# Patient Record
Sex: FEMALE | Race: ASIAN | Employment: FULL TIME | URBAN - METROPOLITAN AREA
[De-identification: names, ages, dates, MRNs, and addresses within clinical notes are randomized per-mention and may not be internally consistent; named-entity substitution may affect disease eponyms.]

---

## 2020-11-25 ENCOUNTER — OFFICE VISIT (OUTPATIENT)
Dept: URGENT CARE | Age: 25
End: 2020-11-25
Payer: COMMERCIAL

## 2020-11-25 VITALS — OXYGEN SATURATION: 99 % | TEMPERATURE: 98.7 F | RESPIRATION RATE: 15 BRPM | HEART RATE: 73 BPM

## 2020-11-25 DIAGNOSIS — J02.9 SORE THROAT: ICD-10-CM

## 2020-11-25 DIAGNOSIS — Z20.822 ENCOUNTER FOR LABORATORY TESTING FOR COVID-19 VIRUS: Primary | ICD-10-CM

## 2020-11-25 DIAGNOSIS — R51.9 NONINTRACTABLE HEADACHE, UNSPECIFIED CHRONICITY PATTERN, UNSPECIFIED HEADACHE TYPE: ICD-10-CM

## 2020-11-25 PROCEDURE — S9083 URGENT CARE CENTER GLOBAL: HCPCS | Performed by: EMERGENCY MEDICINE

## 2020-11-25 RX ORDER — PAROXETINE HYDROCHLORIDE 20 MG/1
20 TABLET, FILM COATED ORAL DAILY
COMMUNITY
End: 2022-08-08 | Stop reason: ALTCHOICE

## 2020-11-25 NOTE — PROGRESS NOTES
Pt woke this AM and noted a mild sore throat that is feeling mostly better but still \"not normal\", some mild HA, also. 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 Offered strep test as well but she declined. Denies any other symptoms. This patient was seen in [facility] at [address] [place] [address] Urgent Care while outdoors at their vehicle due to COVID-19 pandemic with PPE and focused examination in order to decrease community viral transmission       The history is provided by the patient. Sore Throat    The history is provided by the patient. This is a new problem. The current episode started 6 to 12 hours ago (woke with sx this AM). The problem has been gradually improving. There has been no fever. Associated symptoms include headaches (mild postrior aching. Does not feel lke migraine and is mild). Pertinent negatives include no diarrhea, no vomiting, no congestion, no shortness of breath, no trouble swallowing and no cough. History reviewed. No pertinent past medical history. History reviewed. No pertinent surgical history. History reviewed. No pertinent family history.      Social History     Socioeconomic History    Marital status: UNKNOWN     Spouse name: Not on file    Number of children: Not on file    Years of education: Not on file    Highest education level: Not on file   Occupational History    Not on file   Social Needs    Financial resource strain: Not on file    Food insecurity     Worry: Not on file     Inability: Not on file    Transportation needs     Medical: Not on file     Non-medical: Not on file   Tobacco Use    Smoking status: Never Smoker    Smokeless tobacco: Never Used   Substance and Sexual Activity    Alcohol use: Not Currently    Drug use: Not on file    Sexual activity: Not on file   Lifestyle    Physical activity     Days per week: Not on file     Minutes per session: Not on file    Stress: Not on file   Relationships    Social connections     Talks on phone: Not on file     Gets together: Not on file     Attends Gnosticism service: Not on file     Active member of club or organization: Not on file     Attends meetings of clubs or organizations: Not on file     Relationship status: Not on file    Intimate partner violence     Fear of current or ex partner: Not on file     Emotionally abused: Not on file     Physically abused: Not on file     Forced sexual activity: Not on file   Other Topics Concern    Not on file   Social History Narrative    Not on file                ALLERGIES: Patient has no known allergies. Review of Systems   Constitutional: Negative for chills and fever. HENT: Positive for sore throat (mild irritated feeling, no \"hot potatoe voice\" Managing secretions well. Eating and drinking normally). Negative for congestion, rhinorrhea, sinus pressure, sneezing and trouble swallowing. Eyes: Negative for photophobia and pain. Respiratory: Negative for cough and shortness of breath. Cardiovascular: Negative for chest pain. Gastrointestinal: Negative for abdominal pain, diarrhea, nausea and vomiting. Musculoskeletal: Negative for myalgias, neck pain and neck stiffness. Skin: Negative for rash. Neurological: Positive for headaches (mild postrior aching. Does not feel lke migraine and is mild). Negative for dizziness and numbness. Vitals:    11/25/20 1824   Pulse: 73   Resp: 15   Temp: 98.7 °F (37.1 °C)   SpO2: 99%       Physical Exam  Vitals signs and nursing note reviewed. Constitutional:       General: She is not in acute distress. Appearance: Normal appearance. She is normal weight. She is not ill-appearing or toxic-appearing. Comments: Pt examined in a vehicle  Well appearing   HENT:      Head:      Jaw: No trismus, tenderness or swelling. Nose: No rhinorrhea. Mouth/Throat:      Mouth: Mucous membranes are moist.      Pharynx: Oropharynx is clear. Uvula midline.  No pharyngeal swelling, oropharyngeal exudate, posterior oropharyngeal erythema or uvula swelling. Tonsils: No tonsillar exudate or tonsillar abscesses. Comments: Voice clear and managing secretions well. No redness, swelling or exudate  Eyes:      Extraocular Movements: Extraocular movements intact. Conjunctiva/sclera: Conjunctivae normal.      Pupils: Pupils are equal, round, and reactive to light. Comments: No photophobia   Neck:      Musculoskeletal: Normal range of motion and neck supple. No neck rigidity or muscular tenderness. Cardiovascular:      Rate and Rhythm: Normal rate and regular rhythm. Heart sounds: Normal heart sounds. Pulmonary:      Effort: Pulmonary effort is normal. No respiratory distress. Breath sounds: Normal breath sounds. No stridor. No wheezing, rhonchi or rales. Abdominal:      General: Bowel sounds are normal.   Lymphadenopathy:      Cervical: No cervical adenopathy. Skin:     Findings: No rash. Comments: Pt examined in vehicle, limited exposure but what is seen is negative for rashes      Neurological:      General: No focal deficit present. Mental Status: She is alert and oriented to person, place, and time. Comments: Coordinated movement noted on exam with clear speech.  No appreciable deficits appreciated during exam while pt is in vehicle     Psychiatric:         Mood and Affect: Mood normal.         Behavior: Behavior normal.         MDM    Procedures

## 2020-11-29 LAB — SARS-COV-2, NAA: NOT DETECTED

## 2022-08-08 ENCOUNTER — OFFICE VISIT (OUTPATIENT)
Dept: FAMILY MEDICINE CLINIC | Age: 27
End: 2022-08-08
Payer: MEDICAID

## 2022-08-08 VITALS
HEART RATE: 72 BPM | WEIGHT: 119 LBS | TEMPERATURE: 98 F | RESPIRATION RATE: 18 BRPM | DIASTOLIC BLOOD PRESSURE: 71 MMHG | SYSTOLIC BLOOD PRESSURE: 108 MMHG | OXYGEN SATURATION: 100 % | HEIGHT: 60 IN | BODY MASS INDEX: 23.36 KG/M2

## 2022-08-08 DIAGNOSIS — Z01.84: ICD-10-CM

## 2022-08-08 DIAGNOSIS — Z11.59 NEED FOR HEPATITIS C SCREENING TEST: ICD-10-CM

## 2022-08-08 DIAGNOSIS — F32.A ANXIETY AND DEPRESSION: ICD-10-CM

## 2022-08-08 DIAGNOSIS — Z76.89 ENCOUNTER TO ESTABLISH CARE: Primary | ICD-10-CM

## 2022-08-08 DIAGNOSIS — F41.9 ANXIETY AND DEPRESSION: ICD-10-CM

## 2022-08-08 DIAGNOSIS — Z01.84 IMMUNITY TO VARICELLA DETERMINED BY SEROLOGIC TEST: ICD-10-CM

## 2022-08-08 DIAGNOSIS — Z02.0 SCHOOL PHYSICAL EXAM: ICD-10-CM

## 2022-08-08 DIAGNOSIS — H61.23 BILATERAL IMPACTED CERUMEN: ICD-10-CM

## 2022-08-08 LAB
BILIRUB UR QL STRIP: NEGATIVE
GLUCOSE UR-MCNC: NEGATIVE MG/DL
KETONES P FAST UR STRIP-MCNC: NEGATIVE MG/DL
PH UR STRIP: 6 [PH] (ref 4.6–8)
PROT UR QL STRIP: NEGATIVE
SP GR UR STRIP: 1 (ref 1–1.03)
UA UROBILINOGEN AMB POC: NORMAL (ref 0.2–1)
URINALYSIS CLARITY POC: CLEAR
URINALYSIS COLOR POC: YELLOW
URINE BLOOD POC: NORMAL
URINE LEUKOCYTES POC: NEGATIVE
URINE NITRITES POC: NEGATIVE

## 2022-08-08 PROCEDURE — 69210 REMOVE IMPACTED EAR WAX UNI: CPT

## 2022-08-08 PROCEDURE — 81003 URINALYSIS AUTO W/O SCOPE: CPT

## 2022-08-08 PROCEDURE — 99203 OFFICE O/P NEW LOW 30 MIN: CPT

## 2022-08-08 RX ORDER — DICYCLOMINE HYDROCHLORIDE 10 MG/1
CAPSULE ORAL AS NEEDED
COMMUNITY
Start: 2022-07-19

## 2022-08-08 RX ORDER — OMEPRAZOLE 20 MG/1
CAPSULE, DELAYED RELEASE ORAL AS NEEDED
COMMUNITY
Start: 2022-08-03

## 2022-08-08 NOTE — PROGRESS NOTES
Subjective: (As above and below)     Chief Complaint   Patient presents with    [facility]    School/[place] Physical       Jose German is a 32 y.o. female with a history of anxiety/depression, IBS who presents to the office to [facility] and school physical.     Previously used [facility] as PCP. 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 Pap smear: 4 years ago. Dental Exam: within the year. Eye exam: More than 3 years. Reading glasses   Tdap: less than 10 years ago   COVID vaccine: UTD, 3 doses     Diet: Not a lot of fast foods, trying to eat vegetables, Lots of chicken, tries to stay away from red meat. Drinks mostly water. Exercise: 3-4 times a week. Weight lifting, soccer     Anxiety/depression:   Was previously on Fluoxetine and did not have an effect. Then she was switched to paxil 20 mg but it also did not work and she had side effects: fatigue, worsening depression and dry mouth. Currently not on any medication. Has improved daily lifestyle: exercising more and listening to music to help manage symptoms. Patient endorses that symptoms are well-controlled and is not interested in taking medication at this time. Denies SI/HI      Reviewed and agree with Nurse Note duplicated in this note. Reviewed PmHx, RxHx, FmHx, SocHx, AllgHx and updated in chart. Current Outpatient Medications   Medication Sig    dicyclomine (BENTYL) 10 mg capsule     omeprazole (PRILOSEC) 20 mg capsule      No current facility-administered medications for this visit.       No Known Allergies   Past Medical History:   Diagnosis Date    Anxiety     Depression       Past Surgical History:   Procedure Laterality Date    HX WISDOM TEETH EXTRACTION Bilateral       Family History   Problem Relation Age of Onset    Diabetes Paternal Uncle     Diabetes Paternal Grandmother       Social History     Socioeconomic History    Marital status: SINGLE     Spouse name: Not on file    Number of children: Not on file    Years of education: Not on file    Highest education level: Not on file   Occupational History    Not on file   Tobacco Use    Smoking status: Never    Smokeless tobacco: Never   Vaping Use    Vaping Use: Never used   Substance and Sexual Activity    Alcohol use: Yes    Drug use: Never    Sexual activity: Yes     Partners: Female     Birth control/protection: I.U.D. Other Topics Concern    Not on file   Social History Narrative    Not on file     Social Determinants of Health     Financial Resource Strain: Not on file   Food Insecurity: Not on file   Transportation Needs: Not on file   Physical Activity: Not on file   Stress: Not on file   Social Connections: Not on file   Intimate Partner Violence: Not on file   Housing Stability: Not on file             Review of Systems   Constitutional:  Negative for chills, fever, malaise/fatigue and weight loss. HENT: Negative. Eyes: Negative. Respiratory:  Negative for cough and shortness of breath. Cardiovascular:  Negative for chest pain, palpitations, orthopnea and leg swelling. Gastrointestinal:  Negative for abdominal pain, blood in stool, constipation, diarrhea, heartburn, nausea and vomiting. Genitourinary:  Negative for dysuria, frequency, hematuria and urgency. Musculoskeletal: Negative. Skin: Negative. Neurological:  Negative for dizziness, tingling, weakness and headaches. Endo/Heme/Allergies: Negative. Psychiatric/Behavioral: Negative. Objective:     Physical Examination:    Visit Vitals  /71 (BP 1 Location: Left upper arm, BP Patient Position: Sitting, BP Cuff Size: Adult)   Pulse 72   Temp 98 °F (36.7 °C) (Temporal)   Resp 18   Ht 5' (1.524 m)   Wt 119 lb (54 kg)   SpO2 100%   BMI 23.24 kg/m²       Physical Exam  Vitals and nursing note reviewed. Constitutional:       General: She is not in acute distress. Appearance: Normal appearance. HENT:      Head: Normocephalic and atraumatic.       Right Ear: Ear canal and external ear normal. There is no impacted cerumen. Left Ear: Ear canal and external ear normal. There is no impacted cerumen. Ears:      Comments: Post cerumen removal: Bilateral TM normal      Nose: Nose normal.      Mouth/Throat:      Mouth: Mucous membranes are moist.      Pharynx: Oropharynx is clear. Eyes:      Extraocular Movements: Extraocular movements intact. Conjunctiva/sclera: Conjunctivae normal.      Pupils: Pupils are equal, round, and reactive to light. Neck:      Vascular: No carotid bruit. Cardiovascular:      Rate and Rhythm: Normal rate and regular rhythm. Pulses: Normal pulses. Heart sounds: Normal heart sounds. No murmur heard. No gallop. Pulmonary:      Effort: Pulmonary effort is normal. No respiratory distress. Breath sounds: Normal breath sounds. Abdominal:      General: Abdomen is flat. Palpations: Abdomen is soft. Tenderness: no abdominal tenderness   Musculoskeletal:      Cervical back: No tenderness. Right lower leg: No edema. Left lower leg: No edema. Lymphadenopathy:      Cervical: No cervical adenopathy. Skin:     General: Skin is warm and dry. Neurological:      Mental Status: She is alert and oriented to person, place, and time. Deep Tendon Reflexes: Reflexes are normal and symmetric. Psychiatric:         Mood and Affect: Mood normal.         Behavior: Behavior normal.         Thought Content:  Thought content normal.         Judgment: Judgment normal.           3 most recent PHQ Screens 8/8/2022   Little interest or pleasure in doing things Not at all   Feeling down, depressed, irritable, or hopeless Not at all   Total Score PHQ 2 0      Results for orders placed or performed in visit on 08/08/22   AMB POC URINALYSIS DIP STICK AUTO W/O MICRO     Status: None   Result Value Ref Range Status    Color (UA POC) Yellow  Final    Clarity (UA POC) Clear  Final    Glucose (UA POC) Negative Negative Final Bilirubin (UA POC) Negative Negative Final    Ketones (UA POC) Negative Negative Final    Specific gravity (UA POC) 1.005 1.001 - 1.035 Final    Blood (UA POC) Trace Negative Final    pH (UA POC) 6.0 4.6 - 8.0 Final    Protein (UA POC) Negative Negative Final    Urobilinogen (UA POC) 0.2 mg/dL 0.2 - 1 Final    Nitrites (UA POC) Negative Negative Final    Leukocyte esterase (UA POC) Negative Negative Final        Assessment/ Plan:   Differential diagnosis and treatment options reviewed with patient who is in agreement with treatment plan as outlined below. 1. Encounter to establish care  - Recommend patient get a well woman exam. Pap smear is due. Informed her that she can make an appointment with me. - CBC WITH AUTOMATED DIFF; Future  - METABOLIC PANEL, COMPREHENSIVE; Future    2. School physical exam  - Waiting for titers labs to return. Once labs are back will complete form. - Eye exam was abnormal today because she did not have her reading glasses with her. Recommend patient get an eye exam as it has been about 3 years since last one.   - AMB POC URINALYSIS DIP STICK AUTO W/O MICRO    3. Anxiety and depression  - Continue with healthy lifestyle behaviors   - Return to clinic if symptoms become uncontrolled. Will consider an SNRI if needed. - She is aware to go to ER if she has SI/HI    4. Bilateral impacted cerumen  - REMOVE IMPACTED EAR WAX    5. Immunity to varicella determined by serologic test  - VZV AB, IGG; Future      6. Immunity to polio determined by serologic test  - Based on records from patient patient is not UTD on polio vaccine  - Poliovirus antibody add on lab order sent. - Patient will contact [facility] to get immunization records. 7. Need for hepatitis C screening test  - HEPATITIS C AB; Future         Follow-up and Dispositions    Return in about 1 year (around 8/8/2023), or if symptoms worsen or fail to improve.           Patient Labs were reviewed: yes  Patient Past Records were reviewed:  yes    Verbal and written instructions (see AVS) provided. Patient expresses understanding and agreement of diagnosis and treatment plan.     Eleanor Victoria NP

## 2022-08-08 NOTE — PROGRESS NOTES
Chief Complaint   Patient presents with    [address] Maintenance reviewed     1. Have you been to the ER, urgent care clinic since your last visit? Hospitalized since your last visit? No     2. Have you seen or consulted any other health care providers outside of the [address] [place] [address] since your last visit? Include any pap smears or colon screening.   No

## 2022-08-09 LAB
ALBUMIN SERPL-MCNC: 4.3 G/DL (ref 3.5–5)
ALBUMIN/GLOB SERPL: 1.3 {RATIO} (ref 1.1–2.2)
ALP SERPL-CCNC: 56 U/L (ref 45–117)
ALT SERPL-CCNC: 23 U/L (ref 12–78)
ANION GAP SERPL CALC-SCNC: 6 MMOL/L (ref 5–15)
AST SERPL-CCNC: 17 U/L (ref 15–37)
BASOPHILS # BLD: 0 K/UL (ref 0–0.1)
BASOPHILS NFR BLD: 1 % (ref 0–1)
BILIRUB SERPL-MCNC: 0.4 MG/DL (ref 0.2–1)
BUN SERPL-MCNC: 13 MG/DL (ref 6–20)
BUN/CREAT SERPL: 14 (ref 12–20)
CALCIUM SERPL-MCNC: 9.7 MG/DL (ref 8.5–10.1)
CHLORIDE SERPL-SCNC: 107 MMOL/L (ref 97–108)
CO2 SERPL-SCNC: 27 MMOL/L (ref 21–32)
COMMENT, HOLDF: NORMAL
CREAT SERPL-MCNC: 0.93 MG/DL (ref 0.55–1.02)
DIFFERENTIAL METHOD BLD: NORMAL
EOSINOPHIL # BLD: 0.1 K/UL (ref 0–0.4)
EOSINOPHIL NFR BLD: 1 % (ref 0–7)
ERYTHROCYTE [DISTWIDTH] IN BLOOD BY AUTOMATED COUNT: 13.2 % (ref 11.5–14.5)
GLOBULIN SER CALC-MCNC: 3.4 G/DL (ref 2–4)
GLUCOSE SERPL-MCNC: 79 MG/DL (ref 65–100)
HCT VFR BLD AUTO: 43.4 % (ref 35–47)
HCV AB SERPL QL IA: NONREACTIVE
HGB BLD-MCNC: 13.5 G/DL (ref 11.5–16)
IMM GRANULOCYTES # BLD AUTO: 0 K/UL (ref 0–0.04)
IMM GRANULOCYTES NFR BLD AUTO: 0 % (ref 0–0.5)
LYMPHOCYTES # BLD: 2 K/UL (ref 0.8–3.5)
LYMPHOCYTES NFR BLD: 31 % (ref 12–49)
MCH RBC QN AUTO: 28.2 PG (ref 26–34)
MCHC RBC AUTO-ENTMCNC: 31.1 G/DL (ref 30–36.5)
MCV RBC AUTO: 90.6 FL (ref 80–99)
MONOCYTES # BLD: 0.6 K/UL (ref 0–1)
MONOCYTES NFR BLD: 9 % (ref 5–13)
NEUTS SEG # BLD: 3.8 K/UL (ref 1.8–8)
NEUTS SEG NFR BLD: 58 % (ref 32–75)
NRBC # BLD: 0 K/UL (ref 0–0.01)
NRBC BLD-RTO: 0 PER 100 WBC
PLATELET # BLD AUTO: 284 K/UL (ref 150–400)
PMV BLD AUTO: 10.5 FL (ref 8.9–12.9)
POTASSIUM SERPL-SCNC: 4.2 MMOL/L (ref 3.5–5.1)
PROT SERPL-MCNC: 7.7 G/DL (ref 6.4–8.2)
RBC # BLD AUTO: 4.79 M/UL (ref 3.8–5.2)
SAMPLES BEING HELD,HOLD: NORMAL
SODIUM SERPL-SCNC: 140 MMOL/L (ref 136–145)
WBC # BLD AUTO: 6.6 K/UL (ref 3.6–11)

## 2022-08-10 LAB — VZV IGG SER IA-ACNC: <135 INDEX

## 2022-08-12 NOTE — PROGRESS NOTES
Patient came to  school form. Explained to patient she will need to be revaccinated to varicella since she lacks immunity. Patient expressed understanding and will see if she can get vaccinated at the health department or through the school. All other labs were unremarkable.

## 2022-09-26 ENCOUNTER — TELEPHONE (OUTPATIENT)
Dept: FAMILY MEDICINE CLINIC | Age: 27
End: 2022-09-26

## 2022-09-26 NOTE — TELEPHONE ENCOUNTER
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

## 2022-09-26 NOTE — TELEPHONE ENCOUNTER
Pt is calling because she was informed that if she had lifestyle changes to call to let you know    Pt is having a hard time sleeping (both falling and staying asleep) and having anxiety problems    Benadryl and Trazodone dont work or either puts her out to hard making her groggy the next day     What else can she try?

## 2022-09-29 NOTE — TELEPHONE ENCOUNTER
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 She tried reaching out to her psychiatrist several times, but not recv'd a call back. This was a recommendation from the board to reach out to her PCP.

## 2022-10-06 ENCOUNTER — VIRTUAL VISIT (OUTPATIENT)
Dept: FAMILY MEDICINE CLINIC | Age: 27
End: 2022-10-06
Payer: MEDICAID

## 2022-10-06 DIAGNOSIS — F32.5 MAJOR DEPRESSIVE DISORDER WITH SINGLE EPISODE, IN FULL REMISSION (HCC): Primary | ICD-10-CM

## 2022-10-06 PROCEDURE — 99214 OFFICE O/P EST MOD 30 MIN: CPT | Performed by: FAMILY MEDICINE

## 2022-10-06 NOTE — PROGRESS NOTES
Health Maintenance Due   Topic Date Due    Pap Smear  Never done     1. \"Have you been to the ER, urgent care clinic since your last visit? Hospitalized since your last visit? \" No    2. \"Have you seen or consulted any other health care providers outside of the [address] [place] [address] since your last visit? \" No     3. For patients aged 39-70: Has the patient had a colonoscopy / FIT/ Cologuard? NA - based on age      If the patient is female:    4. For patients aged 41-77: Has the patient had a mammogram within the past 2 years? NA - based on age or sex      11. For patients aged 21-65: Has the patient had a pap smear? Yes - Care Gap present.  Rooming MA/LPN to request most recent results

## 2022-10-06 NOTE — PROGRESS NOTES
THIS VISIT WAS COMPLETED VIRTUALLY USING [employer]. FELICITA LEE  Chato Amin is a 32 y.o. female who presents to follow-up on depression. Recently established care. 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 She needs documentation on her current state of mental health. For background tells me that she had a episode of deep depression in 2019. This was a particularly stressful time in her life. Was having a hard time with relationships and life goals. Hit an emotional bottom and made a suicide attempt with beer and overdose on her Paxil. Was hospitalized for a week on psychiatric floor. Paxil was continued with no dose change. Discharged to follow-up with a psychiatrist which she did for about a year. The only change made during that time was Paxil was increased from 10 mg to 20 mg. As a result of this episode the [facility] pharmacy had a case opened monitoring her. She tells me that her psychiatrist ultimately completed the paperwork needed to close that case. 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 Tells me that she ultimately took the Paxil 20 mg for a year or 2 and weaned herself off. Toward the end of her course with Paxil she actually felt like the medication was making her more tired and leahy. About 3 weeks after weaning off she felt substantially better. Since then she has been relying on nonmedication defense mechanisms. Music, exercise, friend group, having \"me time\", stress mitigation. She feels like her mood is well-regulated. She does not feel the need for medication currently. She did not have a counselor after the 2019 episode but did have a counselor while she was a student at [employer] as recently as 2017. She did find that helpful.     In addition to Paxil causing side effects, she has tried Prozac in the past and did not feel like it was effective at all while taking it. PMHx:  Past Medical History:   Diagnosis Date    Anxiety     Depression        Meds:   Current Outpatient Medications   Medication Sig Dispense Refill    dicyclomine (BENTYL) 10 mg capsule as needed. omeprazole (PRILOSEC) 20 mg capsule as needed. Allergies: Allergies   Allergen Reactions    Nsaids (Non-Steroidal Anti-Inflammatory Drug) Other (comments)     Has GI issues     Paxil [Paroxetine Hcl] Other (comments)     Worsening depression  Fatigue   Dry mouth        Smoker:  Social History     Tobacco Use   Smoking Status Never   Smokeless Tobacco Never       ETOH:   Social History     Substance and Sexual Activity   Alcohol Use Yes    Comment: socially       FH:   Family History   Problem Relation Age of Onset    Diabetes Paternal Uncle     Diabetes Paternal Grandmother        ROS:   As listed in HPI. In addition:  Constitutional:   No headache, fever, fatigue, weight loss or weight gain      Cardiac:    No chest pain      Resp:   No cough or shortness of breath      Neuro   No loss of consciousness, dizziness, seizures      Physical Exam:  There were no vitals taken for this visit. GEN: No apparent distress. Alert and oriented and responds to all questions appropriately. NEUROLOGIC:  No focal neurologic deficits. Coordination and gait grossly intact. EXT: Well perfused. No edema. SKIN: No obvious rashes. Due to this being a TeleHealth evaluation, many elements of the physical examination are unable to be assessed. Assessment and Plan     Depression in remission  Has a history of a suicide attempt in 2019.   Has completed recommended course of medication Paxil for 2 years after the episode of depression  Has instituted appropriate defense mechanisms  Is currently euthymic and stable    Mental health treatment is not currently indicated    We discussed that anxiety and depression are often lifelong illnesses that require management with nonmedication and occasional medication strategies. Be optimistic that you will continue feeling good. Be vigilant for any relapse. For now continue with your nonmedication defense mechanisms. If you feel like you are slipping into anxiety or depression I encourage you to seek counseling as your first-line treatment. Everybody gets counseling, some people get medicine. For the purposes of her board of pharmacy:  She has a history of depression currently in remission. She does not pose a danger to herself or others. 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 Greater than 50% of the time was spent counseling the patient on depression. ICD-10-CM ICD-9-CM    1. Major depressive disorder with single episode, in full remission [facility])  F32.5 296.26             Pursuant to the emergency declaration under the [facility], [facility] waiver authority and the [employer] and [employer] Act, this Virtual  Visit was conducted, with patient's consent, to reduce the patient's risk of exposure to COVID-19 and provide continuity of care for an established patient. Services were provided through a video synchronous discussion virtually to substitute for in-person clinic visit.

## 2022-10-07 ENCOUNTER — PATIENT MESSAGE (OUTPATIENT)
Dept: FAMILY MEDICINE CLINIC | Age: 27
End: 2022-10-07

## 2023-02-02 ENCOUNTER — VIRTUAL VISIT (OUTPATIENT)
Dept: FAMILY MEDICINE CLINIC | Age: 28
End: 2023-02-02
Payer: MEDICAID

## 2023-02-02 DIAGNOSIS — R30.0 DYSURIA: ICD-10-CM

## 2023-02-02 DIAGNOSIS — G47.9 SLEEPING DIFFICULTY: ICD-10-CM

## 2023-02-02 DIAGNOSIS — R53.83 FATIGUE, UNSPECIFIED TYPE: ICD-10-CM

## 2023-02-02 DIAGNOSIS — F41.9 ANXIETY: Primary | ICD-10-CM

## 2023-02-02 DIAGNOSIS — R39.15 URGENCY OF URINATION: ICD-10-CM

## 2023-02-02 RX ORDER — NITROFURANTOIN 25; 75 MG/1; MG/1
100 CAPSULE ORAL 2 TIMES DAILY
Qty: 10 CAPSULE | Refills: 0 | Status: SHIPPED | OUTPATIENT
Start: 2023-02-02 | End: 2023-02-07

## 2023-02-02 RX ORDER — DULOXETIN HYDROCHLORIDE 30 MG/1
30 CAPSULE, DELAYED RELEASE ORAL DAILY
Qty: 90 CAPSULE | Refills: 0 | Status: SHIPPED | OUTPATIENT
Start: 2023-02-02

## 2023-02-02 NOTE — PROGRESS NOTES
Wil Moncada is a 29 y.o. female who was seen by synchronous (real-time) audio-video technology on 2/2/2023 for Medication Evaluation    Assessment & Plan:   Diagnoses and all orders for this visit:    1. Anxiety  GABRIELLE-7: 17 Severe anxiety. Denies SI/HI   Will treat anxiety and see if this will have with intermittent sleep difficulties and focus difficulty   Sent referral to [facility] care for further evaluation of anxiety, sleep difficulty and focus difficulty. May need additional behavioral health testing. Discussed expected benefits vs possible side effects of SSRIs/SNRIs. Explained maximal effect may not be noted for 6 weeks. Discussed possibility of increased suicidal tendencies during onset of action. Patient contracts for safety and can identify an accessible social support network. Patient underdstands that medication is more effective when partnered with counseling. Follow up appointment scheduled for 1 month. - DULoxetine (CYMBALTA) 30 mg capsule; Take 1 Capsule by mouth daily. Can increase to 60 mg after 1-2 weeks. Dispense: 90 Capsule; Refill: 0    2. Sleeping difficulty  Will treat anxiety and see if this will help with sleep difficulties   Patient at this time would like to try sleep hygiene first.   Discussed sleep hygiene. Patient to follow-up if symptoms worsen. Can consider low-dose doxepin. 3. Fatigue, unspecified type  Will treat anxiety and see if this helps with fatigue. Will also check lab work. - VITAMIN D, 25 HYDROXY; Future  - TSH 3RD GENERATION; Future  - CBC WITH AUTOMATED DIFF; Future  - METABOLIC PANEL, COMPREHENSIVE; Future  - IRON PROFILE; Future  - FERRITIN; Future  - VITAMIN D, 25 HYDROXY  - TSH 3RD GENERATION  - CBC WITH AUTOMATED DIFF  - METABOLIC PANEL, COMPREHENSIVE  - IRON PROFILE  - FERRITIN    4. Urgency of urination  Patient no longer living in the area. Unable to get UA/culture. Will presumptively treat based on symptoms   Continue to increase hydration.  Can try cranberry juice. - nitrofurantoin, macrocrystal-monohydrate, (MACROBID) 100 mg capsule; Take 1 Capsule by mouth two (2) times a day for 5 days. Dispense: 10 Capsule; Refill: 0    5. Dysuria  See above. - nitrofurantoin, macrocrystal-monohydrate, (MACROBID) 100 mg capsule; Take 1 Capsule by mouth two (2) times a day for 5 days. Dispense: 10 Capsule; Refill: 0     Follow-up and Dispositions    Return in about 4 weeks (around 3/2/2023), or if symptoms worsen or fail to improve, for anxiety follow-up . I spent at least 20 minutes on this visit with this established patient. 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 She will zone out during class about every 2-3 minutes. In her [employer] message to me: \"I find myself thinking about something else and I end up missing a big chunk of my lecture. I tend to have a lot of thoughts going on at once and I'm more impulsive with purchases and indulge on food and activities (like watching Netflix, shopping, etc.) that make me put off studying. It's also been difficult for me to focus during conversations with people. I find myself asking them to repeat what they had said because of the constant thoughts in my head. \"     She is passing all of her classes but does feel like she could do better. No trouble sitting still in class. In order to stay focus she has been trying to section off her assignment but still having a hard time focusing. She is feeling overwhelmed in school so she is not sure if this is her being burnt out/overwhelmed. She also states her anxiety level is higher lately but believes it is more situational anxiety such as when she has to talk in class or do presentation. Depression is still well controlled.  Still doing non-medication therapies such as exercising regularly. Not only is she having trouble focusing she has also been having difficulty falling asleep and at times staying asleep. There will be some nights where she will sleep for only 3 hours or will get up about 2-3 times a night. It takes her most times about 30 minutes to an hour to fall asleep. But on average she does get about 7 hours of sleep most weeks but feels like she would function better on 8 hours. Unsure if the fatigue is related to the lack of sleep. Some days she is so exhausted she will come home from school and just go straight to sleep. In her Where message to me she states she has tried melatonin, trazodone, and hydroxyzine in the past, but they make me extremely groggy the morning after. She recently tried trazodone and hydroxyzine from an old prescription and had similar symptoms as in the past.       Today she also states she is having some urinary symptoms that started this past Tuesday. Started with burning and abnormal odor. She tried to increase her hydration and OTC AZO but that did not help. She continues to have dysuria, abnormal urine odor and now having urgency. Denies any fevers, chills, diaphoresis, URI symptoms, chest pain, SOB, N/V/D, constipation, abdominal pain, pelvic pain or flank pain. Prior to Admission medications    Medication Sig Start Date End Date Taking? Authorizing Provider   nitrofurantoin, macrocrystal-monohydrate, (MACROBID) 100 mg capsule Take 1 Capsule by mouth two (2) times a day for 5 days. 2/2/23 2/7/23 Yes Julio Su NP   DULoxetine (CYMBALTA) 30 mg capsule Take 1 Capsule by mouth daily. Can increase to 60 mg after 1-2 weeks. 2/2/23  Yes Julio Su NP   dicyclomine (BENTYL) 10 mg capsule as needed. 7/19/22  Yes Provider, Historical   omeprazole (PRILOSEC) 20 mg capsule as needed. 8/3/22  Yes Provider, Historical     There is no problem list on file for this patient.   There are no problems to display for this patient. Current Outpatient Medications   Medication Sig Dispense Refill    nitrofurantoin, macrocrystal-monohydrate, (MACROBID) 100 mg capsule Take 1 Capsule by mouth two (2) times a day for 5 days. 10 Capsule 0    DULoxetine (CYMBALTA) 30 mg capsule Take 1 Capsule by mouth daily. Can increase to 60 mg after 1-2 weeks. 90 Capsule 0    dicyclomine (BENTYL) 10 mg capsule as needed. omeprazole (PRILOSEC) 20 mg capsule as needed. Allergies   Allergen Reactions    Nsaids (Non-Steroidal Anti-Inflammatory Drug) Other (comments)     Has GI issues     Paxil [Paroxetine Hcl] Other (comments)     Worsening depression  Fatigue   Dry mouth      Past Medical History:   Diagnosis Date    Anxiety     Depression      Past Surgical History:   Procedure Laterality Date    HX COLONOSCOPY      2/2022    HX ENDOSCOPY      2/2022    HX WISDOM TEETH EXTRACTION Bilateral      Family History   Problem Relation Age of Onset    Diabetes Paternal Uncle     Diabetes Paternal Grandmother      Social History     Tobacco Use    Smoking status: Never    Smokeless tobacco: Never   Substance Use Topics    Alcohol use: Yes     Comment: socially       Review of Systems   Constitutional:  Negative for chills, diaphoresis, fever, malaise/fatigue and weight loss. Respiratory:  Negative for cough and shortness of breath. Cardiovascular:  Negative for chest pain, palpitations and leg swelling. Gastrointestinal:  Negative for abdominal pain, blood in stool, constipation, diarrhea, heartburn, melena, nausea and vomiting. Genitourinary:  Positive for dysuria, frequency and urgency. Negative for flank pain and hematuria. Neurological:  Negative for dizziness, tingling, weakness and headaches. Psychiatric/Behavioral:  Negative for depression, hallucinations, substance abuse and suicidal ideas. The patient is nervous/anxious and has insomnia.       Objective:     Patient-Reported Vitals 1/30/2023   Patient-Reported Weight 119 Patient-Reported Pulse N/A   Patient-Reported Temperature N/A   Patient-Reported SpO2 N/A   Patient-Reported Systolic  769   Patient-Reported Diastolic 72   Patient-Reported LMP N/A      General: alert, cooperative, no distress   Mental  status: normal mood, behavior, speech, dress, motor activity, and thought processes, able to follow commands   HENT: NCAT   Neck: no visualized mass   Resp: no respiratory distress   Neuro: no gross deficits   Skin: no discoloration or lesions of concern on visible areas   Psychiatric: normal affect, consistent with stated mood, no evidence of hallucinations     Additional exam findings:   GABRIELLE-7: 17 Severe anxiety       We discussed the expected course, resolution and complications of the diagnosis(es) in detail. Medication risks, benefits, costs, interactions, and alternatives were discussed as indicated. I advised her to contact the office if her condition worsens, changes or fails to improve as anticipated. She expressed understanding with the diagnosis(es) and plan. Wil Chicaskaylin, was evaluated through a synchronous (real-time) audio-video encounter. The patient (or guardian if applicable) is aware that this is a billable service, which includes applicable co-pays. This Virtual Visit was conducted with patient's (and/or legal guardian's) consent. The visit was conducted pursuant to the emergency declaration under the [address] [place] [address] authority and the [employer] General Act. Patient identification was verified, and a caregiver was present when appropriate. The patient was located at: Other: In school building at [place] at [facility]   The provider was located at:  Facility (Appt Department): [facility]  Boris Mccann NP

## 2023-02-02 NOTE — PROGRESS NOTES
Chief Complaint   Patient presents with    Medication Evaluation       Health Maintenance Due   Topic Date Due    Pap Smear  Never done    COVID-19 Vaccine (4 - Booster for Hwang Peter series) 12/10/2021     1. \"Have you been to the ER, urgent care clinic since your last visit? Hospitalized since your last visit? \" No    2. \"Have you seen or consulted any other health care providers outside of the [address] [place] [address] since your last visit? \" No     3. For patients aged 39-70: Has the patient had a colonoscopy / FIT/ Cologuard? NA - based on age      If the patient is female:    4. For patients aged 41-77: Has the patient had a mammogram within the past 2 years? NA - based on age or sex      11. For patients aged 21-65: Has the patient had a pap smear?  No

## 2023-03-01 ENCOUNTER — TELEPHONE (OUTPATIENT)
Dept: FAMILY MEDICINE CLINIC | Age: 28
End: 2023-03-01

## 2023-03-01 DIAGNOSIS — F41.9 ANXIETY: ICD-10-CM

## 2023-03-01 RX ORDER — DULOXETIN HYDROCHLORIDE 30 MG/1
30 CAPSULE, DELAYED RELEASE ORAL DAILY
Qty: 90 CAPSULE | Refills: 0 | Status: SHIPPED | OUTPATIENT
Start: 2023-03-01

## 2023-03-01 NOTE — TELEPHONE ENCOUNTER
----- Message from Carmela Yuen NP sent at 2/27/2023 10:40 AM EST -----  Got a refill request for her cymbalta. Can we confirm dosage. Not sure if she increased it. She also needs a follow-up appointment. If she increased the dose then follow-up 1 month from the dose increase.

## 2023-03-06 ENCOUNTER — VIRTUAL VISIT (OUTPATIENT)
Dept: FAMILY MEDICINE CLINIC | Age: 28
End: 2023-03-06
Payer: MEDICAID

## 2023-03-06 DIAGNOSIS — F32.A ANXIETY AND DEPRESSION: Primary | ICD-10-CM

## 2023-03-06 DIAGNOSIS — G47.9 SLEEPING DIFFICULTY: ICD-10-CM

## 2023-03-06 DIAGNOSIS — F41.9 ANXIETY AND DEPRESSION: Primary | ICD-10-CM

## 2023-03-06 PROCEDURE — 99214 OFFICE O/P EST MOD 30 MIN: CPT

## 2023-03-06 RX ORDER — HYDROXYZINE PAMOATE 25 MG/1
25 CAPSULE ORAL
COMMUNITY
Start: 2023-02-25

## 2023-03-06 NOTE — PROGRESS NOTES
Chief Complaint   Patient presents with    Medication Evaluation     Follow-up      Health Maintenance reviewed     1. Have you been to the ER, urgent care clinic since your last visit? Hospitalized since your last visit? Yes, ER visit at [facility]     2. Have you seen or consulted any other health care providers outside of the [address] [place] [address] since your last visit? Include any pap smears or colon screening.   No

## 2023-03-06 NOTE — PROGRESS NOTES
Jaspal Rasheed is a 29 y.o. female who was seen by synchronous (real-time) audio-video technology on 3/6/2023 for Medication Evaluation (Follow-up )        Assessment & Plan:   Diagnoses and all orders for this visit:    1. Anxiety and depression  GABRIELLE-7: 19 Severe anxiety   Patient just started on cymbalta 1 week ago. Advised to continue with medication. Offered to add buspar to help with anxiety but patient would like to wait until next visit to give cymbalta some time to take effect. Continue with atarax as needed for anxiety. Continue with exercise as this has helped in the past.   Advised patient to reach out to school financial assistance to help with financial burdens. Advised she reach back out to [facility] care to set up with counseling. If not, then uses resources provided by ED in [facility] as she is based there for school. Patient contracts to safety and understand to go back to ED if she has any SI/HI. Denies SI/HI today. 2. Sleeping difficulty  Continue with Atarax as needed for sleep. Can consider low dose doxepin at next visit. As for her focus trouble. Advised patient she would need to get tested for ADHD. Discussed switching her cymbalta to effexor to see if this could help with some ADHD tendencies but patient refused. I would not do wellbutrin as this can make anxiety worse. Avoid betty/red dye, eat high protein snack, set a timer and if she needs the urge to move may sit in the back of the room so that she can stand from time to time. Recommended she reach out to [facility] care for ADHD testing and possible establishing with psychiatry in  addition to counseling. Follow-up and Dispositions    Return in about 4 weeks (around 4/3/2023), or if symptoms worsen or fail to improve, for anxiety and depression follow-up .           The complexity of medical decision making for this visit is moderate       I spent at least 20 minutes on this visit with this established patient. 712  Subjective:     Patient is here today for evaluation of anxiety/depression. Chart review showed that she went to the ED on 2/25/2023 for worsening anxiety, depression and difficulty sleeping. 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 \" They have helped in the beginning when school started but now they have become very toxic and its hard for me to distance myself from them since the class is small group\". She also reports financial stressors centering on fear of running out of money. \"I worry about not having enough to pay for my apartment\". \"I took out loans to pay tuition, room and my parents support a bit but the money is dwindling and now I have to ration money when I go to grocery\". Her increased academic work has also become another stressor. She reports struggles with sleep, currently averaging 3-5 hours of sleep each night for the past 3 weeks causing her to wake up in low mood and energy, decreased concentration, having no interest in doing anything and feeling anxious and worried. She saw her PCP a month ago in regards to these symptoms and was started on Cymbalta but she never picked up the prescription. Last night she reports coming to the ED because she could not sleep and kept ruminating about her stressors for hours. \"    She was given 1 time dose of xanax and then discharge on 2 weeks of hydroxyzine. Since ED visit she has started cymbalta 30 mg daily, taking it daily without any side effects. She has been taking the hydroxyzine at night on nights she cannot sleep. Will get 7-8 hours of sleep but feels groggy in the AM, does not feel as well rested.      It was noted in ED visit that she has some toxic friends and it is difficulty distancing herself as the classes are small groups however, she is trying to distance herself and is recently going to join a pharmacy fraternity and is hoping she can make other friends outside of her class. Financial burden is still a concern of hers. She also continues to be concerned about her difficulty with focusing. She says before she even started school she had some trouble with focusing and has gotten worse since school. Unsure if this is due to the anxiety/depression. At our last visit she stated \"I find myself thinking about something else and I end up missing a big chunk of my lecture. I tend to have a lot of thoughts going on at once and I'm more impulsive with purchases and indulge on food and activities (like watching Netflix, shopping, etc.) that make me put off studying. It's also been difficult for me to focus during conversations with people. I find myself asking them to repeat what they had said because of the constant thoughts in my head. \"   Today she also states despite lack of sleep and feeling tired she still has a lot of pent up energy and will go to the gym for hours and still  have energy. At times she will do inappropriate things such as, \"kicking a soccer ball in the hallways. \"   She also states that her brother and cousin both have a dx of ADHD. At our last visit [facility] referral was sent and they reach out to the patient but she says that the counselor was no longer with [facility] care and she needs to call back to set up another appointment. Denies any SI/HI       Prior to Admission medications    Medication Sig Start Date End Date Taking? Authorizing Provider   DULoxetine (CYMBALTA) 30 mg capsule Take 1 Capsule by mouth daily. 3/1/23  Yes Jose Reilly NP   dicyclomine (BENTYL) 10 mg capsule as needed. 7/19/22  Yes Provider, Historical   omeprazole (PRILOSEC) 20 mg capsule as needed. 8/3/22  Yes Provider, Historical     There is no problem list on file for this patient. There are no problems to display for this patient.   Current Outpatient Medications   Medication Sig Dispense Refill    DULoxetine (CYMBALTA) 30 mg capsule Take 1 Capsule by mouth daily. 90 Capsule 0    dicyclomine (BENTYL) 10 mg capsule as needed. omeprazole (PRILOSEC) 20 mg capsule as needed. Allergies   Allergen Reactions    Nsaids (Non-Steroidal Anti-Inflammatory Drug) Other (comments)     Has GI issues     Paxil [Paroxetine Hcl] Other (comments)     Worsening depression  Fatigue   Dry mouth      Past Medical History:   Diagnosis Date    Anxiety     Depression      Past Surgical History:   Procedure Laterality Date    HX COLONOSCOPY      2/2022    HX ENDOSCOPY      2/2022    HX WISDOM TEETH EXTRACTION Bilateral      Family History   Problem Relation Age of Onset    Diabetes Paternal Uncle     Diabetes Paternal Grandmother      Social History     Tobacco Use    Smoking status: Never    Smokeless tobacco: Never   Substance Use Topics    Alcohol use: Yes     Comment: socially       Review of Systems   Constitutional:  Negative for chills, diaphoresis, fever, malaise/fatigue and weight loss. HENT: Negative. Eyes: Negative. Respiratory:  Negative for cough and shortness of breath. Cardiovascular:  Negative for chest pain, palpitations and leg swelling. Neurological:  Negative for dizziness, tingling, tremors, sensory change, speech change, focal weakness, seizures, loss of consciousness, weakness and headaches. Psychiatric/Behavioral:  Positive for depression. Negative for hallucinations, memory loss, substance abuse and suicidal ideas. The patient is nervous/anxious and has insomnia.       Objective:     Patient-Reported Vitals 3/6/2023   Patient-Reported Weight 118 lbs   Patient-Reported Pulse -   Patient-Reported Temperature -   Patient-Reported SpO2 -   Patient-Reported Systolic  -   Patient-Reported Diastolic -   Patient-Reported LMP -      General: alert, cooperative, no distress   Mental  status: normal mood, behavior, speech, dress, motor activity, and thought processes, able to follow commands   HENT: NCAT   Neck: no visualized mass   Resp: no respiratory distress   Neuro: no gross deficits   Skin: no discoloration or lesions of concern on visible areas   Psychiatric: normal affect, consistent with stated mood, no evidence of hallucinations     Additional exam findings:   3 most recent PHQ Screens 3/6/2023   Little interest or pleasure in doing things Nearly every day   Feeling down, depressed, irritable, or hopeless Nearly every day   Total Score PHQ 2 6   Trouble falling or staying asleep, or sleeping too much Nearly every day   Feeling tired or having little energy Nearly every day   Poor appetite, weight loss, or overeating Nearly every day   Feeling bad about yourself - or that you are a failure or have let yourself or your family down Nearly every day   Trouble concentrating on things such as school, work, reading, or watching TV Nearly every day   Moving or speaking so slowly that other people could have noticed; or the opposite being so fidgety that others notice Not at all   Thoughts of being better off dead, or hurting yourself in some way Not at all   PHQ 9 Score 21   How difficult have these problems made it for you to do your work, take care of your home and get along with others Very difficult        We discussed the expected course, resolution and complications of the diagnosis(es) in detail. Medication risks, benefits, costs, interactions, and alternatives were discussed as indicated. I advised her to contact the office if her condition worsens, changes or fails to improve as anticipated. She expressed understanding with the diagnosis(es) and plan. Franci Kam, was evaluated through a synchronous (real-time) audio-video encounter. The patient (or guardian if applicable) is aware that this is a billable service, which includes applicable co-pays. This Virtual Visit was conducted with patient's (and/or legal guardian's) consent.  The visit was conducted pursuant to the emergency declaration under the [address] and the National Emergencies Act, [facility] waiver authority and the [employer] General Act. Patient identification was verified, and a caregiver was present when appropriate. The patient was located at: Other: School building in a private study room. The provider was located at:  Facility (Appt Department): [facility]  Boris Cool NP

## 2023-08-28 DIAGNOSIS — F41.9 ANXIETY DISORDER, UNSPECIFIED: ICD-10-CM

## 2023-08-29 RX ORDER — DULOXETIN HYDROCHLORIDE 30 MG/1
CAPSULE, DELAYED RELEASE ORAL
Qty: 90 CAPSULE | Refills: 1 | Status: SHIPPED | OUTPATIENT
Start: 2023-08-29